# Patient Record
Sex: MALE | URBAN - METROPOLITAN AREA
[De-identification: names, ages, dates, MRNs, and addresses within clinical notes are randomized per-mention and may not be internally consistent; named-entity substitution may affect disease eponyms.]

---

## 2024-06-06 ENCOUNTER — OFFICE VISIT (OUTPATIENT)
Dept: URBAN - METROPOLITAN AREA CLINIC 109 | Facility: CLINIC | Age: 54
End: 2024-06-06
Payer: COMMERCIAL

## 2024-06-06 ENCOUNTER — LAB OUTSIDE AN ENCOUNTER (OUTPATIENT)
Dept: URBAN - METROPOLITAN AREA CLINIC 109 | Facility: CLINIC | Age: 54
End: 2024-06-06

## 2024-06-06 VITALS
BODY MASS INDEX: 37.22 KG/M2 | HEART RATE: 76 BPM | TEMPERATURE: 97.5 F | DIASTOLIC BLOOD PRESSURE: 90 MMHG | HEIGHT: 70 IN | SYSTOLIC BLOOD PRESSURE: 159 MMHG | WEIGHT: 260 LBS

## 2024-06-06 DIAGNOSIS — R14.0 ABDOMINAL BLOATING: ICD-10-CM

## 2024-06-06 DIAGNOSIS — R19.8 IRREGULAR BOWEL HABITS: ICD-10-CM

## 2024-06-06 DIAGNOSIS — K59.04 CHRONIC IDIOPATHIC CONSTIPATION: ICD-10-CM

## 2024-06-06 PROCEDURE — 99204 OFFICE O/P NEW MOD 45 MIN: CPT | Performed by: INTERNAL MEDICINE

## 2024-06-06 NOTE — HPI-TODAY'S VISIT:
Patient is referred by [facility] for bloating and constipation.  He was last seen for routine colonoscopy 2019 that showed diverticulosis.  He had an episode of COVID in February and about 2 weeks later began to have significant abdominal complaints.  Of note he has gained 15 pounds in the last 6 months.  There were initially fevers and chills with nausea but no vomiting.  He is now back on his regular diet with no anorexia.  He has had persistent constipation since the COVID with associated bloating.  He does not feel like he gets completely clean.  He does have a long history of mild constipation and he would take a colon cleanse herbal laxative about 1-2 times per month for several years.  There is no significant blood in the stool.  He does admit to markedly less exercise recently.  He is trying a low-carb diet.  There is no focal pain in the abdomen.

## 2024-06-07 ENCOUNTER — DASHBOARD ENCOUNTERS (OUTPATIENT)
Age: 54
End: 2024-06-07

## 2024-06-07 PROBLEM — 82934008: Status: ACTIVE | Noted: 2024-06-07

## 2024-06-24 ENCOUNTER — LAB OUTSIDE AN ENCOUNTER (OUTPATIENT)
Dept: URBAN - METROPOLITAN AREA CLINIC 118 | Facility: CLINIC | Age: 54
End: 2024-06-24

## 2024-08-07 ENCOUNTER — OFFICE VISIT (OUTPATIENT)
Dept: URBAN - METROPOLITAN AREA CLINIC 109 | Facility: CLINIC | Age: 54
End: 2024-08-07

## 2024-08-21 ENCOUNTER — OFFICE VISIT (OUTPATIENT)
Dept: URBAN - METROPOLITAN AREA CLINIC 109 | Facility: CLINIC | Age: 54
End: 2024-08-21
Payer: COMMERCIAL

## 2024-08-21 ENCOUNTER — LAB OUTSIDE AN ENCOUNTER (OUTPATIENT)
Dept: URBAN - METROPOLITAN AREA CLINIC 109 | Facility: CLINIC | Age: 54
End: 2024-08-21

## 2024-08-21 VITALS
DIASTOLIC BLOOD PRESSURE: 97 MMHG | HEART RATE: 75 BPM | HEIGHT: 70 IN | TEMPERATURE: 97.3 F | SYSTOLIC BLOOD PRESSURE: 165 MMHG | WEIGHT: 262.6 LBS | BODY MASS INDEX: 37.59 KG/M2

## 2024-08-21 DIAGNOSIS — R10.84 GENERALIZED ABDOMINAL PAIN: ICD-10-CM

## 2024-08-21 DIAGNOSIS — K59.04 CHRONIC IDIOPATHIC CONSTIPATION: ICD-10-CM

## 2024-08-21 DIAGNOSIS — R19.8 IRREGULAR BOWEL HABITS: ICD-10-CM

## 2024-08-21 PROBLEM — 444702007: Status: ACTIVE | Noted: 2024-08-21

## 2024-08-21 PROBLEM — 102614006: Status: ACTIVE | Noted: 2024-08-21

## 2024-08-21 PROCEDURE — 99214 OFFICE O/P EST MOD 30 MIN: CPT | Performed by: INTERNAL MEDICINE

## 2024-08-21 NOTE — HPI-TODAY'S VISIT:
The patient presents for f/u appt.  has bowel habit changes and abd bloating/discomfort for past few months.  denies gi bleeding, n/v, weight loss (has had weight gain past few months), etc.  has elevated BP, but denies h/o cad/chf but had recent stress test/echo and awaiting results (has f/u appt with cardiologist tomorrow).

## 2024-09-30 ENCOUNTER — OFFICE VISIT (OUTPATIENT)
Dept: URBAN - METROPOLITAN AREA SURGERY CENTER 23 | Facility: SURGERY CENTER | Age: 54
End: 2024-09-30

## 2024-10-21 ENCOUNTER — OFFICE VISIT (OUTPATIENT)
Dept: URBAN - METROPOLITAN AREA SURGERY CENTER 23 | Facility: SURGERY CENTER | Age: 54
End: 2024-10-21

## 2024-10-22 ENCOUNTER — OFFICE VISIT (OUTPATIENT)
Dept: URBAN - METROPOLITAN AREA SURGERY CENTER 23 | Facility: SURGERY CENTER | Age: 54
End: 2024-10-22
Payer: COMMERCIAL

## 2024-10-22 DIAGNOSIS — D12.0 ADENOMA OF CECUM: ICD-10-CM

## 2024-10-22 DIAGNOSIS — K57.30 DIVERTICULOSIS OF COLON: ICD-10-CM

## 2024-10-22 DIAGNOSIS — K63.5 BENIGN COLON POLYP: ICD-10-CM

## 2024-10-22 DIAGNOSIS — Z12.11 COLON CANCER SCREENING: ICD-10-CM

## 2024-10-22 DIAGNOSIS — D12.0 BENIGN NEOPLASM OF CECUM: ICD-10-CM

## 2024-10-22 DIAGNOSIS — R19.4 CHANGE IN BOWEL HABIT: ICD-10-CM

## 2024-10-22 DIAGNOSIS — K63.5 HYPERPLASTIC POLYP OF DESCENDING COLON: ICD-10-CM

## 2024-10-22 PROCEDURE — 45385 COLONOSCOPY W/LESION REMOVAL: CPT | Performed by: INTERNAL MEDICINE

## 2024-10-22 PROCEDURE — 45380 COLONOSCOPY AND BIOPSY: CPT | Performed by: INTERNAL MEDICINE

## 2024-10-22 PROCEDURE — 00812 ANES LWR INTST SCR COLSC: CPT | Performed by: NURSE ANESTHETIST, CERTIFIED REGISTERED

## 2024-10-30 ENCOUNTER — OFFICE VISIT (OUTPATIENT)
Dept: URBAN - METROPOLITAN AREA CLINIC 109 | Facility: CLINIC | Age: 54
End: 2024-10-30

## 2024-11-12 ENCOUNTER — OFFICE VISIT (OUTPATIENT)
Dept: URBAN - METROPOLITAN AREA CLINIC 109 | Facility: CLINIC | Age: 54
End: 2024-11-12
Payer: COMMERCIAL

## 2024-11-12 VITALS
DIASTOLIC BLOOD PRESSURE: 85 MMHG | HEART RATE: 64 BPM | BODY MASS INDEX: 37.22 KG/M2 | TEMPERATURE: 97.7 F | WEIGHT: 260 LBS | SYSTOLIC BLOOD PRESSURE: 145 MMHG | HEIGHT: 70 IN

## 2024-11-12 DIAGNOSIS — Z86.0101 PERSONAL HISTORY OF ADENOMATOUS AND SERRATED COLON POLYPS: ICD-10-CM

## 2024-11-12 DIAGNOSIS — R10.84 GENERALIZED ABDOMINAL PAIN: ICD-10-CM

## 2024-11-12 DIAGNOSIS — K59.04 CHRONIC IDIOPATHIC CONSTIPATION: ICD-10-CM

## 2024-11-12 PROCEDURE — 99213 OFFICE O/P EST LOW 20 MIN: CPT | Performed by: INTERNAL MEDICINE

## 2024-11-12 NOTE — HPI-TODAY'S VISIT:
Patient is a 53 y/o M who presents for follow up of colonoscopy. Results were reviewed in today's visit. Patient reports he is still having bloating and abdominal discomfort. Sxs relieved by BM. Normal bowel habits- BM daily. Denies rectal bleeding, unintentional wt loss. Of note, patient reports a lot of stress since sx onset.  10/2024 Recent colonoscopy w/ Dr. Riggs showed tics, IH, one hyperplastic polyp, one TA. - - - - - - - - - - - - - - - - - - - -  11/12/2024 The patient presents for f/u appt.  has bowel habit changes and abd bloating/discomfort for past few months.  denies gi bleeding, n/v, weight loss (has had weight gain past few months), etc.  has elevated BP, but denies h/o cad/chf but had recent stress test/echo and awaiting results (has f/u appt with cardiologist tomorrow).

## 2025-02-03 ENCOUNTER — OFFICE VISIT (OUTPATIENT)
Dept: URBAN - METROPOLITAN AREA CLINIC 118 | Facility: CLINIC | Age: 55
End: 2025-02-03
Payer: COMMERCIAL

## 2025-02-03 VITALS
DIASTOLIC BLOOD PRESSURE: 89 MMHG | SYSTOLIC BLOOD PRESSURE: 148 MMHG | WEIGHT: 261.6 LBS | HEART RATE: 68 BPM | BODY MASS INDEX: 37.45 KG/M2 | TEMPERATURE: 97.9 F | HEIGHT: 70 IN

## 2025-02-03 DIAGNOSIS — Z86.0101 PERSONAL HISTORY OF ADENOMATOUS AND SERRATED COLON POLYPS: ICD-10-CM

## 2025-02-03 DIAGNOSIS — K21.9 CHRONIC GERD: ICD-10-CM

## 2025-02-03 DIAGNOSIS — R14.0 ABDOMINAL BLOATING: ICD-10-CM

## 2025-02-03 PROBLEM — 235595009: Status: ACTIVE | Noted: 2025-02-03

## 2025-02-03 PROBLEM — 428283002: Status: ACTIVE | Noted: 2025-02-03

## 2025-02-03 PROCEDURE — 99213 OFFICE O/P EST LOW 20 MIN: CPT | Performed by: INTERNAL MEDICINE

## 2025-02-03 NOTE — HPI-TODAY'S VISIT:
The patient presents for follow-up appointment.  doing well overall, has occasional abd bloating sx's but non-progressive and improved with dietary changes.  denies gi bleeding, n/v, weight loss, pain, etc.  has rare gerd sx's.

## 2025-02-26 ENCOUNTER — OFFICE VISIT (OUTPATIENT)
Dept: URBAN - METROPOLITAN AREA CLINIC 109 | Facility: CLINIC | Age: 55
End: 2025-02-26